# Patient Record
Sex: FEMALE | Race: WHITE | NOT HISPANIC OR LATINO | Employment: FULL TIME | URBAN - METROPOLITAN AREA
[De-identification: names, ages, dates, MRNs, and addresses within clinical notes are randomized per-mention and may not be internally consistent; named-entity substitution may affect disease eponyms.]

---

## 2018-07-13 ENCOUNTER — TRANSFERRED RECORDS (OUTPATIENT)
Dept: HEALTH INFORMATION MANAGEMENT | Facility: CLINIC | Age: 33
End: 2018-07-13

## 2019-01-29 ENCOUNTER — TRANSFERRED RECORDS (OUTPATIENT)
Dept: HEALTH INFORMATION MANAGEMENT | Facility: CLINIC | Age: 34
End: 2019-01-29

## 2019-02-12 ENCOUNTER — TRANSFERRED RECORDS (OUTPATIENT)
Dept: HEALTH INFORMATION MANAGEMENT | Facility: CLINIC | Age: 34
End: 2019-02-12

## 2019-03-05 ENCOUNTER — TELEPHONE (OUTPATIENT)
Dept: DERMATOLOGY | Facility: CLINIC | Age: 34
End: 2019-03-05

## 2019-03-05 NOTE — TELEPHONE ENCOUNTER
Left message for Jenna reminding of appointment date and time. Asked that they bring an updated list of medications and any records pertaining to this visit.     Clinic number provided to call back in case this appointment needs to be canceled.

## 2019-03-12 ENCOUNTER — OFFICE VISIT (OUTPATIENT)
Dept: DERMATOLOGY | Facility: CLINIC | Age: 34
End: 2019-03-12
Payer: COMMERCIAL

## 2019-03-12 DIAGNOSIS — L64.9 ANDROGENETIC ALOPECIA: Primary | ICD-10-CM

## 2019-03-12 ASSESSMENT — PAIN SCALES - GENERAL: PAINLEVEL: NO PAIN (0)

## 2019-03-12 NOTE — PATIENT INSTRUCTIONS
It was great to see you today. Our recommendations for you hair loss is solely using Bradford 5% topical foam for men, you should expect some initial increase in hair loss. It normally takes 6 months to 12 months to see large improvement in hair growth.     Or    You can attempt the laser comb therapy, available online. We recommend the cheaper comb.     Recommend if you have low iron - 325mg Ferrous Gluconate or Slow Fe. Ask to have your ferritin checked at your neck visit.     Photobiomodulation (Low Level Laser (Light) Therapy)      What is Photobiomodulation?     Photobiomodulation, also referred to as low level laser therapy,  is an emerging treatment for hair thinning in men and women, also known as pattern hair loss. The devices use light to stimulate the hair follicle.  The exact mechanism is still unknown but there is evidence for improvement with hair growth and density.      What types of devices are available?    Each patient has many different options devices depending on preference for shape, frequency of use and price point.      There is no study comparing these devices. However, most research available is on the Hairmax devices.    Below is a sample of some devices currently available. All are FDA cleared for androgenetic alopecia.    In general, the more laser lights the more expensive the device. However, this does not necessarily mean the device works better.      Lattice Power Lasercomb and Band   Shape Comb or Band   Orellana Comb ($199-$399), Band ($499-$799)   Contact Information [email or web address]  [phone]  [phone]              LaserCap Pro Capillus 82 iGrow Theradome   Shape Hat Baseball Cap Helmet Helmet   Orellana $3,000 $799 $549 $895   Contact Information [employer]  [phone]  [email or web address]  [phone]  [email or web address]  [phone]  [email or web address]   [phone]         If you plan to buy a hair max device, please consider  using the following coupon code as this will give you a discount and also result in a donation from hair max to our medical students.     e  Last update:4/2/2018

## 2019-03-12 NOTE — LETTER
Date:March 26, 2019      Patient was self referred, no letter generated. Do not send.        [facility] Health Information

## 2019-03-12 NOTE — PROGRESS NOTES
[facility] Dermatology Note      Dermatology Problem List:  1.Alopecia - on going since January 2019. Here for 2nd opinion from [facility].     Encounter Date: Mar 12, 2019    CC:  Chief Complaint   Patient presents with     Derm Problem     Jada, Jenna would like a second opinion.          History of Present Illness:  Ms. Jenna Villarreal is a 33 year old female who presents in consultation for alopecia. Presents for a second opinion in regards to alopecia related to Accutane use. In August of 2018 she began to notice hair loss. Her accutane was stopped in November and her hair loss stopped. In January her hair loss began again (a quarter of a handful per shower). A biopsy was obtained at [facility] and she was diagnosed with female pattern hair loss and was given Nutrafol vitamins, Cuticort scalp oil, and Rogaine.     Past Medical History:   There is no problem list on file for this patient.    History reviewed. No pertinent past medical history.  History reviewed. No pertinent surgical history.    Social History:  Patient reports that  has never smoked. she has never used smokeless tobacco.    Family History:  Family History   Problem Relation Age of Onset     Melanoma No family hx of        Medications:  No current outpatient medications on file.     Allergies   Allergen Reactions     Keflex [Cephalexin]          Review of Systems:  -Complete ROS was negative.   -Constitutional: Otherwise feeling well today, in usual state of health.  -HEENT: Patient denies nonhealing oral sores.  -Skin: As above in HPI. No additional skin concerns.    Physical exam:  Vitals: There were no vitals taken for this visit.  GEN: This is a well developed, well-nourished female in no acute distress, in a pleasant mood.    SKIN: Focused examination of the head, neck, scalp and bilateral upper extremities was performed.  - Pull test negative  - Varying thickness of the hair - some evidence of subtle  miniaturization of hairs, with only minimal increase in midline part width  - No localized areas of alopecia  - No erythema, crusting, scaling or other surface changes  -No other lesions of concern on areas examined.     Impression/Plan:  1. Androgenetic alopecia, likely unmasked by a telogen effluvium related to isotretinoin use.    Exam, biopsy results, and history are consistent with female pattern hair loss likely unmasked by accutane use and familial pre-disposition. Patient counseled that she should see a decrease in hair loss 4 months after the stopping of accutane which is consistent with her current symptoms.    Patient counseled on treatment options including light comb therapy and Rogaine 5% topical foam solution and the expected time course (6-12mo) to see improvement.    Recommended iron supplement and iron studies if patient has a history or low iron that may be contributing.     CC Referred Self, MD  No address on file on close of this encounter.  Follow-up prn for new or changing lesions. for new or changing lesions.     Staff Involved:  IWandy MS4, saw and examined the patient in the presence of Dr. Waters.    The medical student acted as a scribe with respect to this patient.  I have performed all the key elements of the history and physical.    Natan Waters MD  Dermatology Attending

## 2019-03-12 NOTE — LETTER
3/12/2019       RE: Jenna Villarreal  [address]  [place] [address]     Dear Colleague,    Thank you for referring your patient, Jenna Villarreal, to the [facility] DERMATOLOGY at [facility]. Please see a copy of my visit note below.    [facility] Dermatology Note      Dermatology Problem List:  1.Alopecia - on going since January 2019. Here for 2nd opinion from [facility].     Encounter Date: Mar 12, 2019    CC:  Chief Complaint   Patient presents with     Derm Problem     Hairloss, Jenna would like a second opinion.          History of Present Illness:  Ms. Jenna Villarreal is a 33 year old female who presents in consultation for alopecia. Presents for a second opinion in regards to alopecia related to Accutane use. In August of 2018 she began to notice hair loss. Her accutane was stopped in November and her hair loss stopped. In January her hair loss began again (a quarter of a handful per shower). A biopsy was obtained at [facility] and she was diagnosed with female pattern hair loss and was given Nutrafol vitamins, Cuticort scalp oil, and Rogaine.     Past Medical History:   There is no problem list on file for this patient.    History reviewed. No pertinent past medical history.  History reviewed. No pertinent surgical history.    Social History:  Patient reports that  has never smoked. she has never used smokeless tobacco.    Family History:  Family History   Problem Relation Age of Onset     Melanoma No family hx of        Medications:  No current outpatient medications on file.     Allergies   Allergen Reactions     Keflex [Cephalexin]          Review of Systems:  -Complete ROS was negative.   -Constitutional: Otherwise feeling well today, in usual state of health.  -HEENT: Patient denies nonhealing oral sores.  -Skin: As above in HPI. No additional skin concerns.    Physical exam:  Vitals: There were no vitals taken for this  visit.  GEN: This is a well developed, well-nourished female in no acute distress, in a pleasant mood.    SKIN: Focused examination of the head, neck, scalp and bilateral upper extremities was performed.  - Pull test negative  - Varying thickness of the hair - some evidence of subtle miniaturization of hairs, with only minimal increase in midline part width  - No localized areas of alopecia  - No erythema, crusting, scaling or other surface changes  -No other lesions of concern on areas examined.     Impression/Plan:  1. Androgenetic alopecia, likely unmasked by a telogen effluvium related to isotretinoin use.    Exam, biopsy results, and history are consistent with female pattern hair loss likely unmasked by accutane use and familial pre-disposition. Patient counseled that she should see a decrease in hair loss 4 months after the stopping of accutane which is consistent with her current symptoms.    Patient counseled on treatment options including light comb therapy and Rogaine 5% topical foam solution and the expected time course (6-12mo) to see improvement.    Recommended iron supplement and iron studies if patient has a history or low iron that may be contributing.     CC Referred Self, MD  No address on file on close of this encounter.  Follow-up prn for new or changing lesions. for new or changing lesions.     Staff Involved:  I, Wandy Deluca MS4, saw and examined the patient in the presence of Dr. Waters.    The medical student acted as a scribe with respect to this patient.  I have performed all the key elements of the history and physical.    Natan Waters MD  Dermatology Attending           Again, thank you for allowing me to participate in the care of your patient.      Sincerely,    Natan Waters MD

## 2019-03-21 ENCOUNTER — OFFICE VISIT (OUTPATIENT)
Dept: PEDIATRICS | Facility: CLINIC | Age: 34
End: 2019-03-21
Payer: COMMERCIAL

## 2019-03-21 VITALS
OXYGEN SATURATION: 99 % | DIASTOLIC BLOOD PRESSURE: 82 MMHG | TEMPERATURE: 98.5 F | WEIGHT: 143 LBS | HEART RATE: 72 BPM | SYSTOLIC BLOOD PRESSURE: 118 MMHG

## 2019-03-21 DIAGNOSIS — R42 VERTIGO: Primary | ICD-10-CM

## 2019-03-21 PROCEDURE — 99213 OFFICE O/P EST LOW 20 MIN: CPT | Performed by: FAMILY MEDICINE

## 2019-03-21 RX ORDER — MECLIZINE HYDROCHLORIDE 25 MG/1
TABLET ORAL
Qty: 20 TABLET | Refills: 1 | Status: SHIPPED | OUTPATIENT
Start: 2019-03-21

## 2019-03-21 NOTE — PROGRESS NOTES
CHIEF COMPLAINT    Dizziness onset 2 nights ago.      HISTORY    Jenna had onset of SX. [place] like something spinning in her head. She has had intermittent exacerbation of vertigo, especially when sitting up from lying.    No HA, visual disturbance. No numbness or weakness.    No recent viral infections or URI's.      There is no problem list on file for this patient.      REVIEW OF SYSTEMS    No fever  No congestion.  No CP or palpitation  No vomiting or diarrhea  No rash      History reviewed. No pertinent past medical history.      EXAM  /82   Pulse 72   Temp 98.5  F (36.9  C) (Tympanic)   Wt 64.9 kg (143 lb)   SpO2 99%     Appears well in general.  HEENT: PERRL, TM's and ear canals normal, nystagmus produced by looking to L  Neck: neg  CV: RSR  Motor, fine motor, gait normal      Eppley maneuvers performed.      (R42) Vertigo  (primary encounter diagnosis)  Comment:   Plan: meclizine (ANTIVERT) 25 MG tablet          Patient advised to return for worsening or persistent symptoms.

## 2019-03-25 PROBLEM — L64.9 ANDROGENETIC ALOPECIA: Status: ACTIVE | Noted: 2019-03-25

## 2019-12-10 ASSESSMENT — MIFFLIN-ST. JEOR: SCORE: 1306.84

## 2019-12-12 ENCOUNTER — ANESTHESIA - HEALTHEAST (OUTPATIENT)
Dept: SURGERY | Facility: AMBULATORY SURGERY CENTER | Age: 34
End: 2019-12-12

## 2019-12-13 ENCOUNTER — SURGERY - HEALTHEAST (OUTPATIENT)
Dept: SURGERY | Facility: AMBULATORY SURGERY CENTER | Age: 34
End: 2019-12-13

## 2019-12-13 ASSESSMENT — MIFFLIN-ST. JEOR: SCORE: 1306.84

## 2021-04-13 ENCOUNTER — TRANSCRIBE ORDERS (OUTPATIENT)
Dept: OTHER | Age: 36
End: 2021-04-13

## 2021-04-13 DIAGNOSIS — E61.1 IRON DEFICIENCY: Primary | ICD-10-CM

## 2021-05-23 ENCOUNTER — HEALTH MAINTENANCE LETTER (OUTPATIENT)
Age: 36
End: 2021-05-23

## 2021-06-04 VITALS — HEIGHT: 65 IN | BODY MASS INDEX: 22.99 KG/M2 | WEIGHT: 138 LBS

## 2021-06-04 NOTE — ANESTHESIA CARE TRANSFER NOTE
Last vitals:   Vitals:    12/13/19 1345   BP: 103/53   Pulse: 97   Resp: 20   Temp: 36.7  C (98.1  F)   SpO2: 100%     Patient's level of consciousness is drowsy  Spontaneous respirations: yes  Maintains airway independently: yes  Dentition unchanged: yes  Oropharynx: oropharynx clear of all foreign objects    QCDR Measures:  ASA# 20 - Surgical Safety Checklist: WHO surgical safety checklist completed prior to induction    PQRS# 430 - Adult PONV Prevention: 4558F - Pt received => 2 anti-emetic agents (different classes) preop & intraop  ASA# 8 - Peds PONV Prevention: NA - Not pediatric patient, not GA or 2 or more risk factors NOT present  PQRS# 424 - Keesha-op Temp Management: 4559F - At least one body temp DOCUMENTED => 35.5C or 95.9F within required timeframe  PQRS# 426 - PACU Transfer Protocol: - Transfer of care checklist used  ASA# 14 - Acute Post-op Pain: ASA14B - Patient did NOT experience pain >= 7 out of 10

## 2021-06-04 NOTE — ANESTHESIA POSTPROCEDURE EVALUATION
Patient: Jenna Villarreal  HEMORRHOIDECTOMY WITH INTRAOPERATIVE COLONOSCOPY, COLONOSCOPY  Anesthesia type: general    Patient location: Phase II Recovery  Last vitals:   Vitals Value Taken Time   /61 12/13/2019  2:40 PM   Temp 37.2  C (98.9  F) 12/13/2019  2:26 PM   Pulse 90 12/13/2019  2:40 PM   Resp 15 12/13/2019  2:40 PM   SpO2 96 % 12/13/2019  2:40 PM     Post vital signs: stable  Level of consciousness: awake, alert and oriented  Post-anesthesia pain: pain controlled  Post-anesthesia nausea and vomiting: no  Pulmonary: unassisted, return to baseline  Cardiovascular: stable and blood pressure at baseline  Hydration: adequate  Anesthetic events: no    QCDR Measures:  ASA# 11 - Keesha-op Cardiac Arrest: ASA11B - Patient did NOT experience unanticipated cardiac arrest  ASA# 12 - Keesha-op Mortality Rate: ASA12B - Patient did NOT die  ASA# 13 - PACU Re-Intubation Rate: ASA13B - Patient did NOT require a new airway mgmt  ASA# 10 - Composite Anes Safety: ASA10A - No serious adverse event    Additional Notes:

## 2021-06-04 NOTE — ANESTHESIA PREPROCEDURE EVALUATION
Anesthesia Evaluation      Patient summary reviewed   History of anesthetic complications (PONV)     Airway   Mallampati: I  Neck ROM: full   Pulmonary - normal exam   (+) asthma (Exercise/cold induced.  Stable)  mild,  well controlled,                          Cardiovascular - normal exam  Exercise tolerance: > or = 4 METS   Neuro/Psych - negative ROS     Endo/Other       Comments: Acute anemia likely due to hemorrhoids    GI/Hepatic/Renal    (-) GERD    Comments: Hemorrhoids, bleeding          Dental    (+) caps    Comment: Molar crowns                       Anesthesia Plan  Planned anesthetic: general endotracheal  Consider TIVA  Pre-induction IVF bolus    Scopolamine patch  Decadron  Zofran  ASA 2   Induction: intravenous   Anesthetic plan and risks discussed with: patient and spouse  Anesthesia plan special considerations: antiemetics,   Post-op plan: routine recovery

## 2021-07-27 NOTE — TELEPHONE ENCOUNTER
RECORDS STATUS - ALL OTHER DIAGNOSIS      RECORDS RECEIVED FROM: Epic   DATE RECEIVED:    NOTES STATUS DETAILS   OFFICE NOTE from referring provider Epic Dr. Natan Waters: 3/12/19   OFFICE NOTE from medical oncologist     DISCHARGE SUMMARY from hospital     DISCHARGE REPORT from the ER     OPERATIVE REPORT     MEDICATION LIST [facility] 3/21/19   CLINICAL TRIAL TREATMENTS TO DATE     LABS     PATHOLOGY REPORTS [facility] 12/31/19: Surg Path   ANYTHING RELATED TO DIAGNOSIS Epic 12/31/19   GENONOMIC TESTING     TYPE:     IMAGING (NEED IMAGES & REPORT)     CT SCANS     MRI     MAMMO     ULTRASOUND     PET

## 2021-07-28 ENCOUNTER — PRE VISIT (OUTPATIENT)
Dept: ONCOLOGY | Facility: CLINIC | Age: 36
End: 2021-07-28

## 2021-07-28 ENCOUNTER — LAB (OUTPATIENT)
Dept: LAB | Facility: CLINIC | Age: 36
End: 2021-07-28
Attending: INTERNAL MEDICINE
Payer: COMMERCIAL

## 2021-07-28 ENCOUNTER — ONCOLOGY VISIT (OUTPATIENT)
Dept: ONCOLOGY | Facility: CLINIC | Age: 36
End: 2021-07-28
Attending: INTERNAL MEDICINE
Payer: COMMERCIAL

## 2021-07-28 VITALS
HEIGHT: 66 IN | TEMPERATURE: 98.2 F | WEIGHT: 158.8 LBS | BODY MASS INDEX: 25.52 KG/M2 | HEART RATE: 84 BPM | SYSTOLIC BLOOD PRESSURE: 119 MMHG | OXYGEN SATURATION: 99 % | DIASTOLIC BLOOD PRESSURE: 83 MMHG

## 2021-07-28 DIAGNOSIS — E61.1 IRON DEFICIENCY: Primary | ICD-10-CM

## 2021-07-28 DIAGNOSIS — E61.1 IRON DEFICIENCY: ICD-10-CM

## 2021-07-28 LAB
BASOPHILS # BLD AUTO: 0 10E3/UL (ref 0–0.2)
BASOPHILS NFR BLD AUTO: 1 %
EOSINOPHIL # BLD AUTO: 0.4 10E3/UL (ref 0–0.7)
EOSINOPHIL NFR BLD AUTO: 7 %
ERYTHROCYTE [DISTWIDTH] IN BLOOD BY AUTOMATED COUNT: 14 % (ref 10–15)
FERRITIN SERPL-MCNC: 7 NG/ML (ref 12–150)
HCT VFR BLD AUTO: 38.2 % (ref 35–47)
HGB BLD-MCNC: 12.2 G/DL (ref 11.7–15.7)
IMM GRANULOCYTES # BLD: 0 10E3/UL
IMM GRANULOCYTES NFR BLD: 0 %
LYMPHOCYTES # BLD AUTO: 1.7 10E3/UL (ref 0.8–5.3)
LYMPHOCYTES NFR BLD AUTO: 28 %
MCH RBC QN AUTO: 25.6 PG (ref 26.5–33)
MCHC RBC AUTO-ENTMCNC: 31.9 G/DL (ref 31.5–36.5)
MCV RBC AUTO: 80 FL (ref 78–100)
MONOCYTES # BLD AUTO: 0.7 10E3/UL (ref 0–1.3)
MONOCYTES NFR BLD AUTO: 11 %
NEUTROPHILS # BLD AUTO: 3.4 10E3/UL (ref 1.6–8.3)
NEUTROPHILS NFR BLD AUTO: 53 %
NRBC # BLD AUTO: 0 10E3/UL
NRBC BLD AUTO-RTO: 0 /100
PLATELET # BLD AUTO: 228 10E3/UL (ref 150–450)
RBC # BLD AUTO: 4.77 10E6/UL (ref 3.8–5.2)
RETICS # AUTO: 0.04 10E6/UL (ref 0.03–0.1)
RETICS/RBC NFR AUTO: 0.9 % (ref 0.5–2)
WBC # BLD AUTO: 6.2 10E3/UL (ref 4–11)

## 2021-07-28 PROCEDURE — 82728 ASSAY OF FERRITIN: CPT

## 2021-07-28 PROCEDURE — 36415 COLL VENOUS BLD VENIPUNCTURE: CPT

## 2021-07-28 PROCEDURE — 85025 COMPLETE CBC W/AUTO DIFF WBC: CPT

## 2021-07-28 PROCEDURE — 85045 AUTOMATED RETICULOCYTE COUNT: CPT

## 2021-07-28 PROCEDURE — G0463 HOSPITAL OUTPT CLINIC VISIT: HCPCS

## 2021-07-28 PROCEDURE — 99204 OFFICE O/P NEW MOD 45 MIN: CPT | Performed by: INTERNAL MEDICINE

## 2021-07-28 RX ORDER — MEPERIDINE HYDROCHLORIDE 25 MG/ML
25 INJECTION INTRAMUSCULAR; INTRAVENOUS; SUBCUTANEOUS EVERY 30 MIN PRN
Status: CANCELLED | OUTPATIENT
Start: 2021-08-02

## 2021-07-28 RX ORDER — DIPHENHYDRAMINE HYDROCHLORIDE 50 MG/ML
50 INJECTION INTRAMUSCULAR; INTRAVENOUS
Status: CANCELLED
Start: 2021-08-02

## 2021-07-28 RX ORDER — ALBUTEROL SULFATE 0.83 MG/ML
2.5 SOLUTION RESPIRATORY (INHALATION)
Status: CANCELLED | OUTPATIENT
Start: 2021-08-02

## 2021-07-28 RX ORDER — METHYLPREDNISOLONE SODIUM SUCCINATE 125 MG/2ML
125 INJECTION, POWDER, LYOPHILIZED, FOR SOLUTION INTRAMUSCULAR; INTRAVENOUS
Status: CANCELLED
Start: 2021-08-02

## 2021-07-28 RX ORDER — KETOCONAZOLE 20 MG/ML
SHAMPOO TOPICAL
COMMUNITY
Start: 2021-06-01

## 2021-07-28 RX ORDER — HEPARIN SODIUM,PORCINE 10 UNIT/ML
5 VIAL (ML) INTRAVENOUS
Status: CANCELLED | OUTPATIENT
Start: 2021-08-02

## 2021-07-28 RX ORDER — NALOXONE HYDROCHLORIDE 0.4 MG/ML
0.2 INJECTION, SOLUTION INTRAMUSCULAR; INTRAVENOUS; SUBCUTANEOUS
Status: CANCELLED | OUTPATIENT
Start: 2021-08-02

## 2021-07-28 RX ORDER — EPINEPHRINE 1 MG/ML
0.3 INJECTION, SOLUTION INTRAMUSCULAR; SUBCUTANEOUS EVERY 5 MIN PRN
Status: CANCELLED | OUTPATIENT
Start: 2021-08-02

## 2021-07-28 RX ORDER — HEPARIN SODIUM (PORCINE) LOCK FLUSH IV SOLN 100 UNIT/ML 100 UNIT/ML
5 SOLUTION INTRAVENOUS
Status: CANCELLED | OUTPATIENT
Start: 2021-08-02

## 2021-07-28 RX ORDER — ALBUTEROL SULFATE 90 UG/1
1-2 AEROSOL, METERED RESPIRATORY (INHALATION)
Status: CANCELLED
Start: 2021-08-02

## 2021-07-28 ASSESSMENT — MIFFLIN-ST. JEOR: SCORE: 1417.06

## 2021-07-28 ASSESSMENT — PAIN SCALES - GENERAL: PAINLEVEL: NO PAIN (0)

## 2021-07-28 NOTE — NURSING NOTE
"Oncology Rooming Note    July 28, 2021 8:06 AM   Jenna Villarreal is a 36 year old female who presents for:    Chief Complaint   Patient presents with     Oncology Clinic Visit     iron deficiency      Initial Vitals: /83   Pulse 84   Temp 98.2  F (36.8  C) (Oral)   Ht 1.676 m (5' 6\")   Wt 72 kg (158 lb 12.8 oz)   SpO2 99%   BMI 25.63 kg/m   Estimated body mass index is 25.63 kg/m  as calculated from the following:    Height as of this encounter: 1.676 m (5' 6\").    Weight as of this encounter: 72 kg (158 lb 12.8 oz). Body surface area is 1.83 meters squared.  No Pain (0) Comment: Data Unavailable   No LMP recorded.  Allergies reviewed: Yes  Medications reviewed: Yes    Medications: Medication refills not needed today.  Pharmacy name entered into EPIC:    [facility] [address]  [place] [id number] [place] [address]    Clinical concerns: none       Selina Cline CMA            "

## 2021-07-28 NOTE — LETTER
"    2021         RE: Jenna Villarreal   [address]  [place] [address]        Dear Colleague,    Thank you for referring your patient, Jenna Villarreal, to the [facility]. Please see a copy of my visit note below.    Hematology Clinic consult note    Reasons for Consult: - iron deficiency anemia    History of Present Illness: Ms. Villarreal is a 36 year old woman referred by Dr. Raghav Waters for iron deficiency anemia. The most recent labs I can find are from 2019, with Hgb of 9.5. No iron studies in chart. But went to OB in 2021 and told that her hgb was 11.8 and her \"stores\" were 9 (do not have report). She has a h/o bleeding hemorrhoids, s/p resection 19. She reports that she has been iron deficient for years. Has lisa intolerant of oral iron- has tried taking with food, different times of day, etc., but cannot tolerate- severe abdominal pain. Cannot even tolerate a multivitamin with iron- causes nausea. Chews on ice constantly. Has menses that last 4-7 days, 14 day cycle. Often has to change super tampon every 2-3 hours. Has nto been on any hormonal birth control for many years. She says she has felt very tired ever since she had COVID in 2020.     Past Medical History:  -anemia  -s/p hemorrhoidectomy  -alopecia  -cystic acne  -dishidrotic eczema  - (twins),  with the twins- no excessive bleeding.   -asthma  -h/o COVID 2020    Medications:  -sertraline  -ketakonazole shampoo    Family History: noncontribuotry    Social History: smoking-never,  Alcohol- ~2 drinks a week. Working full time from home.     Review of systems:  As in HPI. Has gained ~ 20 pounds over the past 2 years due to inactivity. Starting back at the gym.  Eats red meat.  The rest of the > 10 point review of systems was negative.        PHYSICAL EXAMINATION:  /83   Pulse 84   Temp 98.2  F (36.8  C) (Oral)   Ht 1.676 m (5' 6\")   Wt 72 kg (158 lb 12.8 oz)   SpO2 99%   " BMI 25.63 kg/m      General appearance:  Patient is 36 year old woman in no acute distress.     HEENT:  No pallor, icterus, or mucositis.  No thyromegaly.   Lymph nodes:  No cervical, supraclavicular, axillary, or inguinal lymphadenopathy.   Lungs:  Clear to auscultation bilaterally.   Heart:  Regular rate and rhythm; no S3 S4 or murmer.     Abdomen:  Positive bowel sounds, soft and nontender, nondistended.  No hepatomegaly. No splenomegaly appreciated.    Extremities:  No joint swelling or tenderness.  No ankle edema.     Skin:  No rash, no petechiae or ecchymoses.      Labs: pending    Assessment and Recommendation: Chronic Iron deficiency anemia due to pregnancy, menstrual blood loss and h/o hemorrhoidal bleeding. So over the years she has had many reasons to become iron deficient- very common for premenopausal woman. Her menses sound a bit heavier than usual. She would benefit from hormonal treatment to decrease or stop menses. Also very common to not tolerate oral iron. Nearly impossible to make up for losses with dietary iron. Therefore it is reasonable to give IV iron. I prefer InFed, since can give full dose of 1000 mg in one sitting, and most likely brand to be covered by insurance. Discussed benefits and possible small risk of allergic reaction. Small chance that she may need a second dose. She wishes to proceed. First need documentation in our system of the iron deficiency.     -discuss with gynecologist about starting birth control to reduce menstrual bleeding   - CBCDP, retic ferritin today  -InFed 1000 mg IV next available    RTC 2 months with labs, MILADIS visit. If ferritin <20 ng/ml, then give another 1000 mg InFed. If ferritin >20 ng/ml, f/u with PCP or gynecologist in 12 months and can be referred back as needed.     Time: I spent a total of 47 minutes on the day of the visit. Please see the note for further information on patient assessment and treatment.       Lorena Pope,  MD  Hematology        Again, thank you for allowing me to participate in the care of your patient.        Sincerely,        Lorena Pope MD

## 2021-07-28 NOTE — PROGRESS NOTES
"Hematology Clinic consult note    Reasons for Consult: - iron deficiency anemia    History of Present Illness: Ms. Villarreal is a 36 year old woman referred by Dr. Raghav Waters for iron deficiency anemia. The most recent labs I can find are from 2019, with Hgb of 9.5. No iron studies in chart. But went to OB in 2021 and told that her hgb was 11.8 and her \"stores\" were 9 (do not have report). She has a h/o bleeding hemorrhoids, s/p resection 19. She reports that she has been iron deficient for years. Has lisa intolerant of oral iron- has tried taking with food, different times of day, etc., but cannot tolerate- severe abdominal pain. Cannot even tolerate a multivitamin with iron- causes nausea. Chews on ice constantly. Has menses that last 4-7 days, 14 day cycle. Often has to change super tampon every 2-3 hours. Has nto been on any hormonal birth control for many years. She says she has felt very tired ever since she had COVID in 2020.     Past Medical History:  -anemia  -s/p hemorrhoidectomy  -alopecia  -cystic acne  -dishidrotic eczema  - (twins),  with the twins- no excessive bleeding.   -asthma  -h/o COVID 2020    Medications:  -sertraline  -ketakonazole shampoo    Family History: noncontribuotry    Social History: smoking-never,  Alcohol- ~2 drinks a week. Working full time from home.     Review of systems:  As in HPI. Has gained ~ 20 pounds over the past 2 years due to inactivity. Starting back at the gym.  Eats red meat.  The rest of the > 10 point review of systems was negative.        PHYSICAL EXAMINATION:  /83   Pulse 84   Temp 98.2  F (36.8  C) (Oral)   Ht 1.676 m (5' 6\")   Wt 72 kg (158 lb 12.8 oz)   SpO2 99%   BMI 25.63 kg/m      General appearance:  Patient is 36 year old woman in no acute distress.     HEENT:  No pallor, icterus, or mucositis.  No thyromegaly.   Lymph nodes:  No cervical, supraclavicular, axillary, or inguinal lymphadenopathy.   Lungs:  " Clear to auscultation bilaterally.   Heart:  Regular rate and rhythm; no S3 S4 or murmer.     Abdomen:  Positive bowel sounds, soft and nontender, nondistended.  No hepatomegaly. No splenomegaly appreciated.    Extremities:  No joint swelling or tenderness.  No ankle edema.     Skin:  No rash, no petechiae or ecchymoses.      Labs: pending    Assessment and Recommendation: Chronic Iron deficiency anemia due to pregnancy, menstrual blood loss and h/o hemorrhoidal bleeding. So over the years she has had many reasons to become iron deficient- very common for premenopausal woman. Her menses sound a bit heavier than usual. She would benefit from hormonal treatment to decrease or stop menses. Also very common to not tolerate oral iron. Nearly impossible to make up for losses with dietary iron. Therefore it is reasonable to give IV iron. I prefer InFed, since can give full dose of 1000 mg in one sitting, and most likely brand to be covered by insurance. Discussed benefits and possible small risk of allergic reaction. Small chance that she may need a second dose. She wishes to proceed. First need documentation in our system of the iron deficiency.     -discuss with gynecologist about starting birth control to reduce menstrual bleeding   - CBCDP, retic ferritin today  -InFed 1000 mg IV next available    RTC 2 months with labs, MILADIS visit. If ferritin <20 ng/ml, then give another 1000 mg InFed. If ferritin >20 ng/ml, f/u with PCP or gynecologist in 12 months and can be referred back as needed.     Time: I spent a total of 47 minutes on the day of the visit. Please see the note for further information on patient assessment and treatment.       Lorena Pope MD  Hematology

## 2021-07-28 NOTE — NURSING NOTE
Chief Complaint   Patient presents with     Labs Only     blood drawn via VPT by LPN.      MECHELLE Arroyo LPN

## 2021-08-03 ENCOUNTER — PATIENT OUTREACH (OUTPATIENT)
Dept: ONCOLOGY | Facility: CLINIC | Age: 36
End: 2021-08-03

## 2021-08-03 NOTE — PROGRESS NOTES
Pt scheduled for Infed 8/16, message to infusion specialist for insurance approval, currently pending review.

## 2021-08-04 DIAGNOSIS — E61.1 IRON DEFICIENCY: Primary | ICD-10-CM

## 2021-08-04 NOTE — PROGRESS NOTES
Received message from infusion specialist that pt's insurance denied the Iron Infusion unless she was anemic and would need new diagnosis stating so.    Per Dr. Pope: last Hgb was 12.2, advise pt recheck labs a few days before 8/16 infusion, if Hgb >12, will have to cancel infusion and recheck labs again in one month.     Pt stated she lives in [place] and wonders if there is a closer lab or if she can go to local [facility], informed her either was fine. She was ok with scheduling making lab appointment for her with [facility] Monday 8/9 any time, scheduling messaged.

## 2021-08-09 ENCOUNTER — MYC MEDICAL ADVICE (OUTPATIENT)
Dept: ONCOLOGY | Facility: CLINIC | Age: 36
End: 2021-08-09

## 2021-08-09 ENCOUNTER — LAB (OUTPATIENT)
Dept: LAB | Facility: CLINIC | Age: 36
End: 2021-08-09
Attending: INTERNAL MEDICINE
Payer: COMMERCIAL

## 2021-08-09 DIAGNOSIS — E61.1 IRON DEFICIENCY: ICD-10-CM

## 2021-08-09 LAB
BASOPHILS # BLD AUTO: 0 10E3/UL (ref 0–0.2)
BASOPHILS NFR BLD AUTO: 1 %
EOSINOPHIL # BLD AUTO: 0.3 10E3/UL (ref 0–0.7)
EOSINOPHIL NFR BLD AUTO: 6 %
ERYTHROCYTE [DISTWIDTH] IN BLOOD BY AUTOMATED COUNT: 14.3 % (ref 10–15)
FERRITIN SERPL-MCNC: 10 NG/ML (ref 12–150)
HCT VFR BLD AUTO: 39.4 % (ref 35–47)
HGB BLD-MCNC: 12.4 G/DL (ref 11.7–15.7)
IMM GRANULOCYTES # BLD: 0 10E3/UL
IMM GRANULOCYTES NFR BLD: 0 %
LYMPHOCYTES # BLD AUTO: 1.9 10E3/UL (ref 0.8–5.3)
LYMPHOCYTES NFR BLD AUTO: 36 %
MCH RBC QN AUTO: 25.5 PG (ref 26.5–33)
MCHC RBC AUTO-ENTMCNC: 31.5 G/DL (ref 31.5–36.5)
MCV RBC AUTO: 81 FL (ref 78–100)
MONOCYTES # BLD AUTO: 0.6 10E3/UL (ref 0–1.3)
MONOCYTES NFR BLD AUTO: 12 %
NEUTROPHILS # BLD AUTO: 2.4 10E3/UL (ref 1.6–8.3)
NEUTROPHILS NFR BLD AUTO: 45 %
NRBC # BLD AUTO: 0 10E3/UL
NRBC BLD AUTO-RTO: 0 /100
PLATELET # BLD AUTO: 263 10E3/UL (ref 150–450)
RBC # BLD AUTO: 4.87 10E6/UL (ref 3.8–5.2)
WBC # BLD AUTO: 5.3 10E3/UL (ref 4–11)

## 2021-08-09 PROCEDURE — 36415 COLL VENOUS BLD VENIPUNCTURE: CPT

## 2021-08-09 PROCEDURE — 82728 ASSAY OF FERRITIN: CPT

## 2021-08-09 PROCEDURE — 85025 COMPLETE CBC W/AUTO DIFF WBC: CPT

## 2021-08-09 ASSESSMENT — PAIN SCALES - GENERAL: PAINLEVEL: NO PAIN (0)

## 2021-08-09 NOTE — NURSING NOTE
Chief Complaint   Patient presents with     Blood Draw     Labs drawn from  by RN in lab.     Labs drawn by venipuncture by RN in lab.      Jess Allison RN

## 2021-08-10 NOTE — TELEPHONE ENCOUNTER
Called to inform pt her Hgb was 12.4 yesterday so she does not meet parameters for insurance to cover iron infusion. She should recheck labs in 1 month which is already scheduled for 8/30. Pt verbalized understanding.

## 2021-09-02 ENCOUNTER — LAB (OUTPATIENT)
Dept: LAB | Facility: CLINIC | Age: 36
End: 2021-09-02
Payer: COMMERCIAL

## 2021-09-02 DIAGNOSIS — E61.1 IRON DEFICIENCY: ICD-10-CM

## 2021-09-02 LAB — HGB BLD-MCNC: 11.9 G/DL (ref 11.7–15.7)

## 2021-09-02 PROCEDURE — 85018 HEMOGLOBIN: CPT

## 2021-09-02 PROCEDURE — 36415 COLL VENOUS BLD VENIPUNCTURE: CPT

## 2021-09-02 NOTE — NURSING NOTE
Chief Complaint   Patient presents with     Blood Draw     Labs drawn via  by RN.     Labs drawn with vpt by rn.  Pt tolerated well.      Bruce Bauer RN

## 2021-09-11 ENCOUNTER — HEALTH MAINTENANCE LETTER (OUTPATIENT)
Age: 36
End: 2021-09-11

## 2021-10-04 ENCOUNTER — PATIENT OUTREACH (OUTPATIENT)
Dept: ONCOLOGY | Facility: CLINIC | Age: 36
End: 2021-10-04

## 2021-10-04 NOTE — PROGRESS NOTES
Received vm from pt asking to schedule iron infusion. Chart shows her last Hgb was checked 9/2 with result of 11.9. This is too high for iron infusion to be approved by insurance.    Called pt back and reached vm, asked her to call back and confirm if she had more recent labs checked outside of [facility].

## 2022-06-18 ENCOUNTER — HEALTH MAINTENANCE LETTER (OUTPATIENT)
Age: 37
End: 2022-06-18

## 2022-10-30 ENCOUNTER — HEALTH MAINTENANCE LETTER (OUTPATIENT)
Age: 37
End: 2022-10-30

## 2023-06-25 ENCOUNTER — HEALTH MAINTENANCE LETTER (OUTPATIENT)
Age: 38
End: 2023-06-25

## 2024-03-31 ENCOUNTER — HEALTH MAINTENANCE LETTER (OUTPATIENT)
Age: 39
End: 2024-03-31

## 2024-08-18 ENCOUNTER — HEALTH MAINTENANCE LETTER (OUTPATIENT)
Age: 39
End: 2024-08-18